# Patient Record
Sex: FEMALE | Race: WHITE
[De-identification: names, ages, dates, MRNs, and addresses within clinical notes are randomized per-mention and may not be internally consistent; named-entity substitution may affect disease eponyms.]

---

## 2020-02-24 ENCOUNTER — HOSPITAL ENCOUNTER (OUTPATIENT)
Dept: HOSPITAL 62 - OD | Age: 70
End: 2020-02-24
Attending: PHYSICIAN ASSISTANT
Payer: MEDICARE

## 2020-02-24 DIAGNOSIS — I10: Primary | ICD-10-CM

## 2020-02-24 LAB
ALBUMIN SERPL-MCNC: 4.4 G/DL (ref 3.5–5)
ANION GAP SERPL CALC-SCNC: 10 MMOL/L (ref 5–19)
BUN SERPL-MCNC: 17 MG/DL (ref 7–20)
CALCIUM: 10.1 MG/DL (ref 8.4–10.2)
CHLORIDE SERPL-SCNC: 104 MMOL/L (ref 98–107)
CO2 SERPL-SCNC: 25 MMOL/L (ref 22–30)
GLUCOSE SERPL-MCNC: 93 MG/DL (ref 75–110)
PHOSPHATE SERPL-MCNC: 3.5 MG/DL (ref 2.5–4.5)
POTASSIUM SERPL-SCNC: 4 MMOL/L (ref 3.6–5)

## 2020-02-24 PROCEDURE — 36415 COLL VENOUS BLD VENIPUNCTURE: CPT

## 2020-02-24 PROCEDURE — 82383 ASSAY BLOOD CATECHOLAMINES: CPT

## 2020-02-24 PROCEDURE — 82088 ASSAY OF ALDOSTERONE: CPT

## 2020-02-24 PROCEDURE — 83835 ASSAY OF METANEPHRINES: CPT

## 2020-02-24 PROCEDURE — 80069 RENAL FUNCTION PANEL: CPT

## 2020-03-02 ENCOUNTER — HOSPITAL ENCOUNTER (OUTPATIENT)
Dept: HOSPITAL 62 - RAD | Age: 70
End: 2020-03-02
Attending: PHYSICIAN ASSISTANT
Payer: MEDICARE

## 2020-03-02 DIAGNOSIS — I15.9: Primary | ICD-10-CM

## 2020-03-02 PROCEDURE — 93975 VASCULAR STUDY: CPT

## 2020-03-02 NOTE — RADIOLOGY REPORT (SQ)
EXAM DESCRIPTION:  DUPLEX ART/YUMIKO FLOW COMPLETE



COMPLETED DATE/TIME:  3/2/2020 9:20 am



REASON FOR STUDY:  HTN (I10) I15.9  SECONDARY HYPERTENSION, UNSPECIFIED I10  ESSENTIAL (PRIMARY) HYPE
RTENSION



COMPARISON:  None.



TECHNIQUE:  Realtime and static grayscale images acquired. Selected color Doppler, velocities and spe
ctral images recorded.



LIMITATIONS:  Evaluation is limited due to the patient's inability to hold respiration for an extende
d time.



FINDINGS:  RIGHT KIDNEY:

RENAL ARTERY VELOCITIES: 57 cm/sec at the origin, 98.6 cm/sec at its midportion, and 70.8 cm/sec at t
he hilum.  The segmental artery PSV = 25.5 cm/sec.

RENAL VEIN:  Patent.

VELOCITY RATIO: 0.87.  Normal waveforms.

KIDNEY:  The right kidney measures 9.8 cm in length.  There is no hydronephrosis.

LEFT KIDNEY:

RENAL ARTERY VELOCITIES: 108 cm/sec at the origin, 53.6 cm/sec at its midportion, and 42.7 cm/sec at 
the hilum.  The segmental artery PSV = 28 cm/sec.

RENAL VEIN:  Patent.

VELOCITY RATIO: 0.96. Normal waveforms.

KIDNEY:  The left kidney measures 10.2 cm in length.  There is no hydronephrosis.

BLADDER: Normal.

OTHER: No other finding.



IMPRESSION:  No Doppler evidence of renal artery stenosis.



COMMENT:  NORMAL RENAL ARTERY/AORTA VELOCITY RATIO IS LESS THAN OR EQUAL TO 3.5.\



TECHNICAL DOCUMENTATION:  JOB ID:  [id number]

 2011 [id number]- All Rights Reserved



Reading location - IP/workstation name: [email or web address]

## 2020-05-08 ENCOUNTER — HOSPITAL ENCOUNTER (OUTPATIENT)
Dept: HOSPITAL 62 - OD | Age: 70
End: 2020-05-08
Attending: PHYSICIAN ASSISTANT
Payer: MEDICARE

## 2020-05-08 DIAGNOSIS — I10: Primary | ICD-10-CM

## 2020-05-08 LAB
ALBUMIN SERPL-MCNC: 4.4 G/DL (ref 3.5–5)
ANION GAP SERPL CALC-SCNC: 8 MMOL/L (ref 5–19)
BUN SERPL-MCNC: 15 MG/DL (ref 7–20)
CALCIUM: 10.2 MG/DL (ref 8.4–10.2)
CHLORIDE SERPL-SCNC: 103 MMOL/L (ref 98–107)
CO2 SERPL-SCNC: 26 MMOL/L (ref 22–30)
GLUCOSE SERPL-MCNC: 88 MG/DL (ref 75–110)
PHOSPHATE SERPL-MCNC: 3.9 MG/DL (ref 2.5–4.5)
POTASSIUM SERPL-SCNC: 4.1 MMOL/L (ref 3.6–5)

## 2020-05-08 PROCEDURE — 80069 RENAL FUNCTION PANEL: CPT

## 2020-05-08 PROCEDURE — 36415 COLL VENOUS BLD VENIPUNCTURE: CPT

## 2020-10-12 ENCOUNTER — HOSPITAL ENCOUNTER (OUTPATIENT)
Dept: HOSPITAL 62 - ER | Age: 70
Setting detail: OBSERVATION
LOS: 1 days | Discharge: HOME | End: 2020-10-13
Payer: MEDICARE

## 2020-10-12 DIAGNOSIS — R11.2: ICD-10-CM

## 2020-10-12 DIAGNOSIS — I10: ICD-10-CM

## 2020-10-12 DIAGNOSIS — R10.13: ICD-10-CM

## 2020-10-12 DIAGNOSIS — R10.11: ICD-10-CM

## 2020-10-12 DIAGNOSIS — K80.12: Primary | ICD-10-CM

## 2020-10-12 DIAGNOSIS — R14.0: ICD-10-CM

## 2020-10-12 DIAGNOSIS — Z79.82: ICD-10-CM

## 2020-10-12 DIAGNOSIS — Z79.899: ICD-10-CM

## 2020-10-12 DIAGNOSIS — Z20.828: ICD-10-CM

## 2020-10-12 DIAGNOSIS — I25.10: ICD-10-CM

## 2020-10-12 LAB
ADD MANUAL DIFF: NO
ALBUMIN SERPL-MCNC: 4.5 G/DL (ref 3.5–5)
ALP SERPL-CCNC: 94 U/L (ref 38–126)
ANION GAP SERPL CALC-SCNC: 15 MMOL/L (ref 5–19)
APPEARANCE UR: CLEAR
APTT PPP: YELLOW S
AST SERPL-CCNC: 29 U/L (ref 14–36)
BASE EXCESS BLDV CALC-SCNC: 2.4 MMOL/L
BASOPHILS # BLD AUTO: 0 10^3/UL (ref 0–0.2)
BASOPHILS NFR BLD AUTO: 0.1 % (ref 0–2)
BILIRUB DIRECT SERPL-MCNC: 0.2 MG/DL (ref 0–0.4)
BILIRUB SERPL-MCNC: 1.8 MG/DL (ref 0.2–1.3)
BILIRUB UR QL STRIP: NEGATIVE
BUN SERPL-MCNC: 18 MG/DL (ref 7–20)
CALCIUM: 10.4 MG/DL (ref 8.4–10.2)
CHLORIDE SERPL-SCNC: 89 MMOL/L (ref 98–107)
CO2 SERPL-SCNC: 25 MMOL/L (ref 22–30)
EOSINOPHIL # BLD AUTO: 0 10^3/UL (ref 0–0.6)
EOSINOPHIL NFR BLD AUTO: 0 % (ref 0–6)
ERYTHROCYTE [DISTWIDTH] IN BLOOD BY AUTOMATED COUNT: 12.5 % (ref 11.5–14)
GLUCOSE SERPL-MCNC: 139 MG/DL (ref 75–110)
GLUCOSE UR STRIP-MCNC: NEGATIVE MG/DL
HCO3 BLDV-SCNC: 28.4 MMOL/L (ref 20–32)
HCT VFR BLD CALC: 44.6 % (ref 36–47)
HGB BLD-MCNC: 15.9 G/DL (ref 12–15.5)
INR PPP: 0.99
KETONES UR STRIP-MCNC: NEGATIVE MG/DL
LYMPHOCYTES # BLD AUTO: 1.6 10^3/UL (ref 0.5–4.7)
LYMPHOCYTES NFR BLD AUTO: 9 % (ref 13–45)
MCH RBC QN AUTO: 32.4 PG (ref 27–33.4)
MCHC RBC AUTO-ENTMCNC: 35.6 G/DL (ref 32–36)
MCV RBC AUTO: 91 FL (ref 80–97)
MONOCYTES # BLD AUTO: 1.4 10^3/UL (ref 0.1–1.4)
MONOCYTES NFR BLD AUTO: 8 % (ref 3–13)
NEUTROPHILS # BLD AUTO: 14.9 10^3/UL (ref 1.7–8.2)
NEUTS SEG NFR BLD AUTO: 82.9 % (ref 42–78)
PCO2 BLDV: 48.9 MMHG (ref 35–63)
PH BLDV: 7.38 [PH] (ref 7.3–7.42)
PH UR STRIP: 7 [PH] (ref 5–9)
PLATELET # BLD: 357 10^3/UL (ref 150–450)
POTASSIUM SERPL-SCNC: 3.9 MMOL/L (ref 3.6–5)
PROT SERPL-MCNC: 7.3 G/DL (ref 6.3–8.2)
PROT UR STRIP-MCNC: 30 MG/DL
PROTHROMBIN TIME: 13.3 SEC (ref 11.4–15.4)
RBC # BLD AUTO: 4.9 10^6/UL (ref 3.72–5.28)
SP GR UR STRIP: 1.01
TOTAL CELLS COUNTED % (AUTO): 100 %
UROBILINOGEN UR-MCNC: NEGATIVE MG/DL (ref ?–2)
WBC # BLD AUTO: 18 10^3/UL (ref 4–10.5)

## 2020-10-12 PROCEDURE — 83605 ASSAY OF LACTIC ACID: CPT

## 2020-10-12 PROCEDURE — 85610 PROTHROMBIN TIME: CPT

## 2020-10-12 PROCEDURE — 76705 ECHO EXAM OF ABDOMEN: CPT

## 2020-10-12 PROCEDURE — 85025 COMPLETE CBC W/AUTO DIFF WBC: CPT

## 2020-10-12 PROCEDURE — 96374 THER/PROPH/DIAG INJ IV PUSH: CPT

## 2020-10-12 PROCEDURE — 96361 HYDRATE IV INFUSION ADD-ON: CPT

## 2020-10-12 PROCEDURE — 99285 EMERGENCY DEPT VISIT HI MDM: CPT

## 2020-10-12 PROCEDURE — C9290 INJ, BUPIVACAINE LIPOSOME: HCPCS

## 2020-10-12 PROCEDURE — 83690 ASSAY OF LIPASE: CPT

## 2020-10-12 PROCEDURE — 36415 COLL VENOUS BLD VENIPUNCTURE: CPT

## 2020-10-12 PROCEDURE — 00790 ANES IPER UPR ABD NOS: CPT

## 2020-10-12 PROCEDURE — 0FT44ZZ RESECTION OF GALLBLADDER, PERCUTANEOUS ENDOSCOPIC APPROACH: ICD-10-PCS | Performed by: SURGERY

## 2020-10-12 PROCEDURE — 88304 TISSUE EXAM BY PATHOLOGIST: CPT

## 2020-10-12 PROCEDURE — 87635 SARS-COV-2 COVID-19 AMP PRB: CPT

## 2020-10-12 PROCEDURE — 74300 X-RAY BILE DUCTS/PANCREAS: CPT

## 2020-10-12 PROCEDURE — 82803 BLOOD GASES ANY COMBINATION: CPT

## 2020-10-12 PROCEDURE — 80053 COMPREHEN METABOLIC PANEL: CPT

## 2020-10-12 PROCEDURE — 47563 LAPARO CHOLECYSTECTOMY/GRAPH: CPT

## 2020-10-12 PROCEDURE — S0028 INJECTION, FAMOTIDINE, 20 MG: HCPCS

## 2020-10-12 PROCEDURE — 71045 X-RAY EXAM CHEST 1 VIEW: CPT

## 2020-10-12 PROCEDURE — 93005 ELECTROCARDIOGRAM TRACING: CPT

## 2020-10-12 PROCEDURE — G0378 HOSPITAL OBSERVATION PER HR: HCPCS

## 2020-10-12 PROCEDURE — 81001 URINALYSIS AUTO W/SCOPE: CPT

## 2020-10-12 PROCEDURE — C9803 HOPD COVID-19 SPEC COLLECT: HCPCS

## 2020-10-12 PROCEDURE — 93010 ELECTROCARDIOGRAM REPORT: CPT

## 2020-10-12 PROCEDURE — 84484 ASSAY OF TROPONIN QUANT: CPT

## 2020-10-12 PROCEDURE — 87040 BLOOD CULTURE FOR BACTERIA: CPT

## 2020-10-12 RX ADMIN — FAMOTIDINE SCH MG: 10 INJECTION INTRAVENOUS at 21:37

## 2020-10-12 RX ADMIN — METOPROLOL TARTRATE SCH MG: 50 TABLET, FILM COATED ORAL at 23:05

## 2020-10-12 RX ADMIN — PIPERACILLIN AND TAZOBACTAM SCH MLS/HR: 3; .375 INJECTION, POWDER, LYOPHILIZED, FOR SOLUTION INTRAVENOUS; PARENTERAL at 21:37

## 2020-10-12 NOTE — ER DOCUMENT REPORT
ED Medical Screen (RME)





- General


Chief Complaint: Nausea/Vomiting


Stated Complaint: NAUSEA,VOMITING,ABDOMINAL PAIN


Time Seen by Provider: 10/12/20 12:27


Mode of Arrival: Wheelchair


Information source: Patient


Notes: 





Patient is a 62-year-old female presenting with right upper quadrant/epigastric 

pain that began 3 days ago.  She also reports associated vomiting.  She states 

every time she eats she vomits.  She denies any fever, chills, diarrhea.  She 

has had no abdominal surgeries.





Abdomen soft, nontender, nondistended.





I have greeted and performed a rapid initial assessment of this patient.  A 

comprehensive ED assessment and evaluation of the patient, analysis of test 

results and completion of the medical decision making process will be conducted 

by additional ED providers.  I have specifically instructed the patient or 

family members with the patient to immediately return to any nursing staff 

should anything change in the patient's condition or with their chief complaint.





TRAVEL OUTSIDE OF THE [place] IN LAST 30 DAYS: No





- Related Data


Allergies/Adverse Reactions: 


                                        





No Known Allergies Allergy (Verified 10/12/20 12:28)


   








Home Medications: BP meds





Past Medical History





- Social History


Frequency of alcohol use: None


Drug Abuse: None





Physical Exam





- Vital signs


Vitals: 





                                        











Temp Pulse Resp BP Pulse Ox


 


 98.2 F   63   20   94/47 L  98 


 


 10/12/20 12:18  10/12/20 12:18  10/12/20 12:18  10/12/20 12:18  10/12/20 12:18














Course





- Vital Signs


Vital signs: 





                                        











Temp Pulse Resp BP Pulse Ox


 


 98.2 F   63   20   94/47 L  98 


 


 10/12/20 12:18  10/12/20 12:18  10/12/20 12:18  10/12/20 12:18  10/12/20 12:18

## 2020-10-12 NOTE — PDOC H&P
History of Present Illness


Admission Date/PCP: 


  10/12/20 15:53





  





Patient complains of: RUQ pain, nausea, and vomiting


History of Present Illness: 


ANA MARIA CARROLL is a 70 year old female with a 1 day history of right upper 

quadrant abdominal pain, nausea, and vomiting.  It began after she ate a 

strawberry milkshake.  She immediately began to feel nauseated, and had u

ncontrollable vomiting.  After vomiting, she felt less nauseated, however her 

pain continued.  Her pain worsened, which is when she decided to present to the 

emergency department.  In the ER the pain is unrelenting, situated in the right 

upper quadrant, and is sharp in nature.  She reports that pain medicine is the 

only thing that has lessened her discomfort.  Eating makes it worse.  She rates 

her pain as 8 out of 10.  She has a history of cardiac stents, and hypertension.

 She denies diabetes, renal failure, hepatic failure, or other serious medical 

problem.








Past Medical History


Cardiac Medical History: Reports: Coronary Artery Disease, Hypertension


Pulmonary Medical History: Reports: None


Endocrine Medical History: 


   Denies: Diabetes Mellitus Type 1, Diabetes Mellitus Type 2





Past Surgical History


Past Surgical History: Reports: Cardiac Catheterization - Stent placement, 

Hysterectomy





Social History


Lives with: Family


Smoking Status: Never Smoker


Electronic Cigarette use?: No


Frequency of Alcohol Use: None


Hx Recreational Drug Use: No


Hx Prescription Drug Abuse: No





Family History


Family History: Reviewed & Not Pertinent


Parental Family History Reviewed: Yes


Children Family History Reviewed: Yes


Sibling(s) Family History Reviewed.: Yes





Medication/Allergy


Home Medications: 








Aspirin [Ecotrin 81 mg EC Tablet] 81 mg PO DAILY 10/12/20 


Metoprolol Tartrate [Lopressor 50 mg Tablet] 50 mg PO BID 10/12/20 








Allergies/Adverse Reactions: 


                                        





No Known Allergies Allergy (Verified 10/12/20 12:28)


   











Review of Systems


Constitutional: ABSENT: chills, fatigue, fever(s)


Eyes: ABSENT: visual disturbances


Ears: ABSENT: hearing changes


Nose, Mouth, and Throat: ABSENT: sore throat


Cardiovascular: ABSENT: chest pain


Respiratory: ABSENT: cough, dyspnea


Gastrointestinal: PRESENT: abdominal pain, bloating, nausea, vomiting.  ABSENT: 

heartburn, hematemesis, hematochezia, melena


Genitourinary: ABSENT: dysuria


Musculoskeletal: ABSENT: back pain


Integumentary: ABSENT: pruritus, rash


Neurological: ABSENT: confusion, convulsions, dizziness


Psychiatric: ABSENT: anxiety, depression


Endocrine: ABSENT: cold intolerance, heat intolerance


Hematologic/Lymphatic: ABSENT: easy bleeding, easy bruising





Physical Exam


Vital Signs: 


                                        











Temp Pulse Resp BP Pulse Ox


 


 98.3 F   65   18   145/68 H  96 


 


 10/12/20 15:31  10/12/20 15:31  10/12/20 15:31  10/12/20 15:31  10/12/20 15:31








                                 Intake & Output











 10/11/20 10/12/20 10/13/20





 06:59 06:59 06:59


 


Intake Total   1100


 


Balance   1100


 


Weight   75.4 kg











General appearance: PRESENT: no acute distress, cooperative


Head exam: PRESENT: atraumatic, normocephalic


Eye exam: PRESENT: EOMI, PERRLA.  ABSENT: scleral icterus


Mouth exam: PRESENT: moist, neck supple


Teeth exam: ABSENT: poor dentation


Neck exam: ABSENT: meningismus, tenderness, thyromegaly, tracheal deviation


Respiratory exam: PRESENT: unlabored.  ABSENT: tachypnea, wheezes


Cardiovascular exam: ABSENT: tachycardia


GI/Abdominal exam: PRESENT: guarding, Thompson's sign, tenderness - RUQ.  ABSENT: 

distended


Rectal exam: PRESENT: deferred


Extremities exam: ABSENT: clubbing


Musculoskeletal exam: ABSENT: deformity


Neurological exam: PRESENT: alert, awake, oriented to person, oriented to place,

oriented to time, oriented to situation, CN II-XII grossly intact


Psychiatric exam: ABSENT: agitated, anxious


Focused psych exam: ABSENT: delusional


Skin exam: ABSENT: cyanosis, erythema, jaundice





Results


Laboratory Results: 


                                        





                                 10/12/20 13:45 





                                 10/12/20 13:45 





                                        











  10/12/20 10/12/20 10/12/20





  13:45 13:45 15:06


 


WBC  18.0 H  


 


RBC  4.90  


 


Hgb  15.9 H  


 


Hct  44.6  


 


MCV  91  


 


MCH  32.4  


 


MCHC  35.6  


 


RDW  12.5  


 


Plt Count  357  


 


Seg Neutrophils %  82.9 H  


 


VBG pH    7.38


 


VBG pCO2    48.9


 


VBG HCO3    28.4


 


VBG Base Excess    2.4


 


Sodium   129.0 L 


 


Potassium   3.9 


 


Chloride   89 L 


 


Carbon Dioxide   25 


 


Anion Gap   15 


 


BUN   18 


 


Creatinine   1.20 


 


Est GFR ( Amer)   54 L 


 


Glucose   139 H 


 


Lactic Acid   


 


Calcium   10.4 H 


 


Total Bilirubin   1.8 H 


 


AST   29 


 


Alkaline Phosphatase   94 


 


Total Protein   7.3 


 


Albumin   4.5 


 


Lipase   66.8 


 


Urine Color   


 


Urine Appearance   


 


Urine pH   


 


Ur Specific Gravity   


 


Urine Protein   


 


Urine Glucose (UA)   


 


Urine Ketones   


 


Urine Blood   


 


Urine RBC (Auto)   














  10/12/20 10/12/20





  15:06 15:28


 


WBC  


 


RBC  


 


Hgb  


 


Hct  


 


MCV  


 


MCH  


 


MCHC  


 


RDW  


 


Plt Count  


 


Seg Neutrophils %  


 


VBG pH  


 


VBG pCO2  


 


VBG HCO3  


 


VBG Base Excess  


 


Sodium  


 


Potassium  


 


Chloride  


 


Carbon Dioxide  


 


Anion Gap  


 


BUN  


 


Creatinine  


 


Est GFR (African Amer)  


 


Glucose  


 


Lactic Acid  1.6 


 


Calcium  


 


Total Bilirubin  


 


AST  


 


Alkaline Phosphatase  


 


Total Protein  


 


Albumin  


 


Lipase  


 


Urine Color   YELLOW


 


Urine Appearance   CLEAR


 


Urine pH   7.0


 


Ur Specific Gravity   1.008


 


Urine Protein   30 H


 


Urine Glucose (UA)   NEGATIVE


 


Urine Ketones   NEGATIVE


 


Urine Blood   SMALL H


 


Urine RBC (Auto)   4








                                        











  10/12/20





  13:45


 


Troponin I  0.038











Impressions: 


                                        





Abdomen Ultrasound  10/12/20 12:31


IMPRESSION:  Cholelithiasis with positive sonographic Thompson sign.  Correlate 

for cholecystitis.


 








Chest X-Ray  10/12/20 14:14


IMPRESSION:  NO ACUTE RADIOGRAPHIC FINDING IN THE CHEST.


 














Assessment & Plan





- Diagnosis


(1) Acute cholecystitis due to biliary calculus


Is this a current diagnosis for this admission?: Yes   





- Time


Anticipated Discharge Disposition: Home, Self Care


Anticipated Discharge Timeframe: within 48 hours





- Plan Summary


Plan Summary: 





70-year-old female with right upper quadrant pain, nausea, vomiting, and 

evidence of acute cholecystitis.  She has an elevated white blood cell count and

a mildly elevated bilirubin.  I have recommended surgical intervention for her. 

She has agreed to this.  COVID test now.  Start antibiotics and IV fluids.  Plan

for surgical intervention in the near future.  Risk/benefits discussed, informed

consent obtained, and all questions answered.

## 2020-10-12 NOTE — RADIOLOGY REPORT (SQ)
EXAM DESCRIPTION:  CHEST SINGLE VIEW



IMAGES COMPLETED DATE/TIME:  10/12/2020 2:42 pm



REASON FOR STUDY:  sepsis



COMPARISON:  None.



EXAM PARAMETERS:  NUMBER OF VIEWS: One view.

TECHNIQUE: Single frontal radiographic view of the chest acquired.

RADIATION DOSE: NA

LIMITATIONS: None.



FINDINGS:  LUNGS AND PLEURA: No opacities, masses or pneumothorax. No pleural effusion.

MEDIASTINUM AND HILAR STRUCTURES: No masses.  Contour normal.

HEART AND VASCULAR STRUCTURES: Heart normal in size.  Normal vasculature.

BONES: No acute findings.

HARDWARE: None in the chest.

OTHER: No other significant finding.



IMPRESSION:  NO ACUTE RADIOGRAPHIC FINDING IN THE CHEST.



TECHNICAL DOCUMENTATION:  JOB ID:  [id number]

 2011 [employer]- All Rights Reserved



Reading location - IP/workstation name: [email or web address]

## 2020-10-12 NOTE — ER DOCUMENT REPORT
ED Medical Screen (RME)





- General


Chief Complaint: Nausea/Vomiting


Stated Complaint: NAUSEA,VOMITING,ABDOMINAL PAIN


Time Seen by Provider: 10/12/20 12:27


Mode of Arrival: Wheelchair


TRAVEL OUTSIDE OF THE [place] IN LAST 30 DAYS: No





- Related Data


Pertinent History: 





Chief complaint: Nausea vomiting and abdominal pain





History of present illness: 70-year-old female with no known prior history of 

gallbladder disease Zen with complaint of epigastric and right upper quadrant 

discomfort associated with nausea vomiting for over 48 hours.  Symptoms began at

the patient consumed a milkshake.  She denies fever chills.  She denies dysuria.

 She is having 5/10 discomfort right now.


Allergies/Adverse Reactions: 


                                        





No Known Allergies Allergy (Verified 10/12/20 12:28)


   








Home Medications: BP meds





Past Medical History





- General


Information source: Patient





- Social History


Frequency of alcohol use: None


Drug Abuse: None


Family history: Reviewed & Not Pertinent





- Past Medical History


Cardiac Medical History: Reports: Hx Coronary Artery Disease, Hx Hypertension, 

Other - Past history of stent placement


Pulmonary Medical History: Reports: None


Endocrine Medical History: Denies: Hx Diabetes Mellitus Type 1, Hx Diabetes 

Mellitus Type 2


Past Surgical History: Reports: Hx Hysterectomy





Review of Systems





- Review of Systems


Notes: 





Constitutional: Negative for fever.


HENT: Negative for sore throat.


Eyes: Negative for visual changes.


Cardiovascular: Negative for chest pain.


Respiratory: Negative for shortness of breath.


Gastrointestinal: As per HPI.


Genitourinary: Negative for dysuria.


Musculoskeletal: Negative for back pain.


Skin: Negative for rash.


Neurological: Negative for headaches, weakness or numbness.





10 point ROS negative except as marked above and in HPI.








Physical Exam





- Vital signs


Vitals: 





                                        











Temp Pulse Resp BP Pulse Ox


 


 98.2 F   63   20   94/47 L  98 


 


 10/12/20 12:18  10/12/20 12:18  10/12/20 12:18  10/12/20 12:18  10/12/20 12:18














- Notes


Notes: 











GENERAL: Well-developed well-nourished appearing in no acute distress.  Does not

look toxic.





SKIN: Good turgor no rashes.





HEAD: Normocephalic atraumatic.





EYES: PERRLA.  EOMI.  Conjunctivae and sclerae clear.





EARS: CANALS AND TMS CLEAR.





NOSE: CLEAR.





MOUTH: Moist mucosa.  Good dentition.  No stridor or edema.  No drooling.





NECK: Supple.  No masses or thyromegaly.  No adenopathy.  Carotids 2+ without 

bruits.  No JVD.





BACK: Symmetrical without tenderness.





CHEST: Respirations unlabored.  Breath sounds clear and symmetrical.





HEART: Regular rhythm.  No murmur gallop or rub.





ABDOMEN: Moderately tender right upper quadrant and epigastrium.  Soft without 

masses, organomegaly or rebound.  Bowel sounds normally active.  No bruits.





GENITALIA: Deferred.





EXTREMITIES: No edema.  No calf tenderness.  Cap refill less than 1.5 seconds.  

Dorsalis pedis and posterior tibial pulses 3+ and symmetrical.





NEUROLOGICAL: GCS 15.  Alert and oriented x3.  Normal gait.  Fluent speech.  

Cranial nerves II through XII intact.  Sensorimotor and cerebellar normal.  

Normal tone.





PSYCHIATRIC: Appropriate affect.





Course





- Re-evaluation


Re-evalutation: 





10/12/20 15:38


Bili 1.8.  Transaminases and lipase normal.  Gallbladder ultrasound shows wall 

thickening, multiple stones present and positive sonographic Thompson sign with no

ductal dilatation.  White count is 18,000.  Lactate is pending.  Blood cultures 

drawn.  Patient is been given IV Zosyn.  She is also been given Zofran and IV 

fluids.  Will consult with Dr. Jane the on-call general surgeon.





- Vital Signs


Vital signs: 





                                        











Temp Pulse Resp BP Pulse Ox


 


 98.3 F   65   18   145/68 H  96 


 


 10/12/20 15:31  10/12/20 15:31  10/12/20 15:31  10/12/20 15:31  10/12/20 15:31














- Laboratory


Result Diagrams: 


                                 10/12/20 13:45





                                 10/12/20 13:45


Laboratory results interpreted by me: 





                                        











  10/12/20 10/12/20





  13:45 13:45


 


WBC  18.0 H 


 


Hgb  15.9 H 


 


Lymph % (Auto)  9.0 L 


 


Absolute Neuts (auto)  14.9 H 


 


Seg Neutrophils %  82.9 H 


 


Sodium   129.0 L


 


Chloride   89 L


 


Est GFR ( Amer)   54 L


 


Est GFR (MDRD) Non-Af   45 L


 


Glucose   139 H


 


Calcium   10.4 H


 


Total Bilirubin   1.8 H














Doctor's Discharge





- Discharge


Clinical Impression: 


 Acute cholecystitis due to biliary calculus

## 2020-10-12 NOTE — EKG REPORT
SEVERITY:- ABNORMAL ECG -

SINUS RHYTHM

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

:

Confirmed by: Cuauhtemoc Childers 12-Oct-2020 19:52:53

## 2020-10-12 NOTE — ER DOCUMENT REPORT
ED General





- General


Chief Complaint: Nausea/Vomiting


Stated Complaint: NAUSEA,VOMITING,ABDOMINAL PAIN


Time Seen by Provider: 10/12/20 12:27


Mode of Arrival: Wheelchair


TRAVEL OUTSIDE OF THE [place] IN LAST 30 DAYS: No





- HPI


Notes: 





Chief complaint: Nausea vomiting and abdominal pain





History of present illness: 70-year-old female with no known prior history of 

gallbladder disease David with complaint of epigastric and right upper quadrant 

discomfort associated with nausea vomiting for over 48 hours.  Symptoms began at

the patient consumed a milkshake.  She denies fever chills.  She denies dysuria.

 She is having 5/10 discomfort right now.





- Related Data


Allergies/Adverse Reactions: 


                                        





No Known Allergies Allergy (Verified 10/12/20 12:28)


   








Home Medications: BP meds





Past Medical History





- General


Information source: Patient





- Social History


Smoking Status: Never Smoker


Frequency of alcohol use: None


Drug Abuse: None


Lives with: Family


Family History: Reviewed & Not Pertinent





- Past Medical History


Cardiac Medical History: Reports: Hx Coronary Artery Disease, Hx Hypertension, 

Other - Past history of stent placement


Pulmonary Medical History: Reports: None


Endocrine Medical History: Denies: Hx Diabetes Mellitus Type 1, Hx Diabetes 

Mellitus Type 2


Past Surgical History: Reports: Hx Cardiac Surgery - stinits, Hx Hysterectomy





Review of Systems





- Review of Systems


Notes: 





Review of Systems


Notes: 





Constitutional: Negative for fever.


HENT: Negative for sore throat.


Eyes: Negative for visual changes.


Cardiovascular: Negative for chest pain.


Respiratory: Negative for shortness of breath.


Gastrointestinal: As per HPI.


Genitourinary: Negative for dysuria.


Musculoskeletal: Negative for back pain.


Skin: Negative for rash.


Neurological: Negative for headaches, weakness or numbness.





10 point ROS negative except as marked above and in HPI.





Physical Exam





- Vital signs


Vitals: 


                                        











Temp Pulse Resp BP Pulse Ox


 


 98.2 F   63   20   94/47 L  98 


 


 10/12/20 12:18  10/12/20 12:18  10/12/20 12:18  10/12/20 12:18  10/12/20 12:18














- Notes


Notes: 











GENERAL: Well-developed well-nourished female approximately stated age appearing

in no acute distress.





SKIN: Good turgor no rashes.





HEAD: Normocephalic atraumatic.





EYES: PERRLA.  EOMI.  Conjunctivae and sclerae clear.





EARS: CANALS AND TMS CLEAR.





NOSE: CLEAR.





MOUTH: Moist mucosa.  Good dentition.  No stridor or edema.  No drooling.





NECK: Supple.  No masses or thyromegaly.  No adenopathy.  Carotids 2+ without 

bruits.  No JVD.





BACK: Symmetrical without tenderness.





CHEST: Respirations unlabored.  Breath sounds clear and symmetrical.





HEART: Regular rhythm.  No murmur gallop or rub.





ABDOMEN: Moderate tenderness right upper quadrant.  Soft without masses, 

organomegaly or rebound.  Bowel sounds normally active.  No bruits.





GENITALIA: Deferred.





EXTREMITIES: No edema.  No calf tenderness.  Cap refill less than 1.5 seconds.  

Dorsalis pedis and posterior tibial pulses 3+ and symmetrical.





NEUROLOGICAL: GCS 15.  Alert and oriented x3.  Normal gait.  Fluent speech.  

Cranial nerves II through XII intact.  Sensorimotor and cerebellar normal.  

Normal tone.





PSYCHIATRIC: Appropriate affect.





Course





- Re-evaluation


Re-evalutation: 





10/12/20 15:45


IV Zosyn.  IV normal saline.  We will consult surgical list Dr. Jane for 

admission.





- Vital Signs


Vital signs: 


                                        











Temp Pulse Resp BP Pulse Ox


 


 98.3 F   65   18   145/68 H  96 


 


 10/12/20 15:31  10/12/20 15:31  10/12/20 15:31  10/12/20 15:31  10/12/20 15:31














- Laboratory


Result Diagrams: 


                                 10/12/20 13:45





                                 10/12/20 13:45


Laboratory results interpreted by me: 


                                        











  10/12/20 10/12/20





  13:45 13:45


 


WBC  18.0 H 


 


Hgb  15.9 H 


 


Lymph % (Auto)  9.0 L 


 


Absolute Neuts (auto)  14.9 H 


 


Seg Neutrophils %  82.9 H 


 


Sodium   129.0 L


 


Chloride   89 L


 


Est GFR ( Amer)   54 L


 


Est GFR (MDRD) Non-Af   45 L


 


Glucose   139 H


 


Calcium   10.4 H


 


Total Bilirubin   1.8 H














- Diagnostic Test


Radiology reviewed: Reports reviewed - Per radiologist: Normal chest x-ray.  

Gallbladder ultrasound shows multiple stones and positive sonographic Thompson 

sign without any ductal dilatation





- EKG Interpretation by Me


Additional EKG results interpreted by me: 





10/12/20 15:45


Twelve-lead EKG reviewed by me contemporaneously: 1441 hrs.


Indication for study: Upper abdominal pain


Rhythm: Normal sinus


Rate: 66


Intervals: Normal


QRS axis: Is 5 degrees


ST/T wave changes: 3579


Comparison with prior tracing: None present





Interpretation: Normal sinus rhythm.  Old inferior infarct.





Discharge





- Discharge


Clinical Impression: 


 Acute cholecystitis due to biliary calculus





Condition: Good


Disposition: ADMITTED AS INPATIENT


Admitting Provider: Dr. Jane


Unit Admitted: Surgical Floor

## 2020-10-12 NOTE — RADIOLOGY REPORT (SQ)
EXAM DESCRIPTION:  U/S ABDOMEN LIMITED W/O DOP



IMAGES COMPLETED DATE/TIME:  10/12/2020 1:36 pm



REASON FOR STUDY:  RUG/epigastric pain



COMPARISON:  None.



TECHNIQUE:  Dynamic and static grayscale images acquired of the abdomen and recorded on PACS. Priyao
chay selected color Doppler and spectral images recorded.



LIMITATIONS:  None.



FINDINGS:  PANCREAS: No masses.  Visualized pancreatic duct normal caliber.

LIVER: No masses. Echotexture normal.

LIVER VASCULATURE: Normal directional flow of the main portal vein and hepatic veins.

GALLBLADDER: Gallstones are present.  There is no significant wall thickening.  There is some sludge.
  There is no pericholecystic fluid.

ULTRASOUND-DETECTED THOMPSON'S SIGN: Positive.

INTRAHEPATIC DUCTS AND COMMON DUCT: CBD and intrahepatic ducts normal caliber. No filling defects.

AORTA: No aneurysm.

RIGHT KIDNEY:  Normal size, 10 cm. Normal echogenicity. No solid or suspicious masses. No hydronephro
sis. No calcifications.

PERITONEAL AND RIGHT PLEURAL SPACE: No ascites or effusions.

OTHER: No other significant findings.



IMPRESSION:  Cholelithiasis with positive sonographic Thompson sign.  Correlate for cholecystitis.



TECHNICAL DOCUMENTATION:  JOB ID:  [id number]

 2011 [employer]- All Rights Reserved



Reading location - IP/workstation name: [email or web address]

## 2020-10-13 VITALS — SYSTOLIC BLOOD PRESSURE: 119 MMHG | DIASTOLIC BLOOD PRESSURE: 45 MMHG

## 2020-10-13 LAB
ADD MANUAL DIFF: NO
ALBUMIN SERPL-MCNC: 2.9 G/DL (ref 3.5–5)
ALP SERPL-CCNC: 86 U/L (ref 38–126)
ANION GAP SERPL CALC-SCNC: 9 MMOL/L (ref 5–19)
AST SERPL-CCNC: 132 U/L (ref 14–36)
BASOPHILS # BLD AUTO: 0 10^3/UL (ref 0–0.2)
BASOPHILS NFR BLD AUTO: 0.2 % (ref 0–2)
BILIRUB DIRECT SERPL-MCNC: 0.7 MG/DL (ref 0–0.4)
BILIRUB SERPL-MCNC: 2 MG/DL (ref 0.2–1.3)
BUN SERPL-MCNC: 16 MG/DL (ref 7–20)
CALCIUM: 8.9 MG/DL (ref 8.4–10.2)
CHLORIDE SERPL-SCNC: 104 MMOL/L (ref 98–107)
CO2 SERPL-SCNC: 23 MMOL/L (ref 22–30)
EOSINOPHIL # BLD AUTO: 0 10^3/UL (ref 0–0.6)
EOSINOPHIL NFR BLD AUTO: 0.1 % (ref 0–6)
ERYTHROCYTE [DISTWIDTH] IN BLOOD BY AUTOMATED COUNT: 12.5 % (ref 11.5–14)
GLUCOSE SERPL-MCNC: 119 MG/DL (ref 75–110)
HCT VFR BLD CALC: 36.1 % (ref 36–47)
HGB BLD-MCNC: 12.5 G/DL (ref 12–15.5)
LYMPHOCYTES # BLD AUTO: 1.1 10^3/UL (ref 0.5–4.7)
LYMPHOCYTES NFR BLD AUTO: 8.5 % (ref 13–45)
MCH RBC QN AUTO: 32.2 PG (ref 27–33.4)
MCHC RBC AUTO-ENTMCNC: 34.8 G/DL (ref 32–36)
MCV RBC AUTO: 93 FL (ref 80–97)
MONOCYTES # BLD AUTO: 1.1 10^3/UL (ref 0.1–1.4)
MONOCYTES NFR BLD AUTO: 8.2 % (ref 3–13)
NEUTROPHILS # BLD AUTO: 11.1 10^3/UL (ref 1.7–8.2)
NEUTS SEG NFR BLD AUTO: 83 % (ref 42–78)
PLATELET # BLD: 224 10^3/UL (ref 150–450)
POTASSIUM SERPL-SCNC: 3.1 MMOL/L (ref 3.6–5)
PROT SERPL-MCNC: 5.4 G/DL (ref 6.3–8.2)
RBC # BLD AUTO: 3.9 10^6/UL (ref 3.72–5.28)
TOTAL CELLS COUNTED % (AUTO): 100 %
WBC # BLD AUTO: 13.4 10^3/UL (ref 4–10.5)

## 2020-10-13 RX ADMIN — PIPERACILLIN AND TAZOBACTAM SCH MLS/HR: 3; .375 INJECTION, POWDER, LYOPHILIZED, FOR SOLUTION INTRAVENOUS; PARENTERAL at 05:45

## 2020-10-13 RX ADMIN — METOPROLOL TARTRATE SCH MG: 50 TABLET, FILM COATED ORAL at 13:59

## 2020-10-13 RX ADMIN — FAMOTIDINE SCH MG: 10 INJECTION INTRAVENOUS at 13:59

## 2020-10-13 NOTE — DISCHARGE SUMMARY
Discharge Summary (SDC)





- Discharge


Final Diagnosis: 





Acute cholecystitis


Date of Surgery: 10/13/20


Discharge Date: 10/13/20


Condition: Good


Prescriptions: 


Hydrocodone/Acetaminophen [Norco  mg Tablet] 1 tab PO Q6HP PRN #15 tablet


 PRN Reason: 


Discharge Diet: As Tolerated


Discharge Activity: Activity As Tolerated, No Lifting Over 10 Pounds


Report the Following to Your Physician Immediately: Vomiting, Fever over 101 

Degrees, Unusual Bleeding - appoint with me in sugery clinic in 7-10 days.

## 2020-10-13 NOTE — OPERATIVE REPORT
Nonrecallable Operative Report


DATE OF SURGERY: 10/13/20


PREOPERATIVE DIAGNOSIS: cholecystitis


POSTOPERATIVE DIAGNOSIS: cholecystitis


OPERATION: laparoscoic cholecystectomy iwth cholangiograms


SURGEON: AMINAH MOMIN


ANESTHESIA: GA


TISSUE REMOVED OR ALTERED: gallbladder


COMPLICATIONS: 





none


ESTIMATED BLOOD LOSS: 50cc


INTRAOPERATIVE FINDINGS: see note


PROCEDURE: 








After obtaining informed consent, the patient was taken to the operating room.  

General  Anesthesia was induced; the arms were extended, and the abdomen was 

exposed, and prepped and draped in a sterile fashion.  Instrumentation was set 

up for laparoscopic cholecystectomy.


 


Surgical plan and surgical timeout were conducted.


 


A vertical incision was made above the umbilicus, and a verres needle was 

inserted uneventfully into the peritoneal cavity.  Pneumoperitoneum was 

established.


 


The verres needle was removed and a millimeter trocar was inserted and a 10 mm 

laparoscope was inserted.  Visualization of the peritoneal cavity confirmed safe

uneventful entry.  Under direct visualization 3 additional 5 mm ports were 

established, one in the subxiphoid position and second in the subcostal 

position.  Visualization of the hepatobiliary anatomy revealed no anatomic 

variations.  A grasper was placed on the fundus of the gallbladder and the 

gallbladder is elevated over the right surface of the liver; a second grasper 

was used to grasp the infundibulum of the gallbladder.  The neck of the 

gallbladder and junction with the cystic duct was dissected out.  The Cystic 

artery was in its usual location medial and cephalad to the cystic duct.  The 

cystic artery was surrounded with a right angle clamp, clipped twice proximally 

and divided with laparoscopic scissors.


 


We now opened the triangle of Calot by dividing the peritoneal reflection on 

both the medial and lateral sides of the cystic duct infundibular junction.  The

critical view was obtained.  We now milked the cystic duct of any possible 

stones, clipped the cystic duct the specimen side and performed a cystic 

ductotomy.  Cholangiogram catheter was then placed into the cystic duct stump 

intraoperative cholangiogram revealed good flow into the duodenum both right and

left hepatic ducts were identified no stones were seen in the common bile duct.





A Endo Clip was then placed x2 on the cystic duct stump when the cholangiogram 

catheter was removed


 


The gallbladder was now removed from the undersurface of the liver using hook 

cautery dissection.  Graspers were repositioned and the gallbladder was removed 

uneventfully from the abdominal cavity through the super umbilical port site 

incision.  The specimen was examined, then passed off to pathology for permanent

analysis.


 


We returned to the peritoneal cavity check for bleeding, and evidence of bile 

leak, and there was none.  We  Confirmed satisfactory placement of clips on 

cystic duct and cystic artery were secured .  At this point we felt  the 

operation was complete.  The subcutaneous tissue was then anesthetized  with 

quarter percent Marcaine Sponge and needle counts are correct.  All ports rem

vandana under direct visualization pneumoperitoneum evacuated, and 5 mm port wounds

closed with 3-0 Vicryl suture, benzoin and Steri-Strips.


 


The patient was extubated, and taken to the recovery room in stable condition.

## 2020-10-14 NOTE — RADIOLOGY REPORT (SQ)
EXAM DESCRIPTION:  CHOLANGIOGRAM OPERATIVE



IMAGES COMPLETED DATE/TIME:  10/14/2020 1:31 pm



REASON FOR STUDY:  LAP RIKY WITH IOC R10.11  RIGHT UPPER QUADRANT PAIN R11.2  NAUSEA WITH VOMITING, 
UNSPECIFIED



COMPARISON:  None.



FLUOROSCOPY TIME:  0.3 minutes

Spot images saved to PACS.



TECHNIQUE:  Intra-operative images acquired during surgical procedure to evaluate progress.

NUMBER OF IMAGES: 1



LIMITATIONS:  None.



FINDINGS:  Fluoroscopy was provided for intraoperative procedure.  Please refer to the operative repo
rt for further discussion.



IMPRESSION:  IMAGE(S) OBTAINED DURING PROCEDURE.



COMMENT:  Quality :  Final reports for procedures using fluoroscopy that document radiation exp
osure indices, or exposure time and number of fluorographic images (if radiation exposure indices are
 not available)

Please consult full operative report of the attending physician for description of the procedure.



TECHNICAL DOCUMENTATION:  JOB ID:  [id number]

 2011 [employer] acquired by [employer]- All Rights Reserved



Reading location - IP/workstation name: [email or web address]